# Patient Record
Sex: MALE | Race: OTHER | NOT HISPANIC OR LATINO | URBAN - METROPOLITAN AREA
[De-identification: names, ages, dates, MRNs, and addresses within clinical notes are randomized per-mention and may not be internally consistent; named-entity substitution may affect disease eponyms.]

---

## 2022-11-25 ENCOUNTER — HOSPITAL ENCOUNTER (INPATIENT)
Facility: HOSPITAL | Age: 42
LOS: 6 days | Discharge: HOME | DRG: 885 | End: 2022-12-01
Attending: PSYCHIATRY & NEUROLOGY | Admitting: PSYCHIATRY & NEUROLOGY

## 2022-11-25 ENCOUNTER — HOSPITAL ENCOUNTER (EMERGENCY)
Facility: HOSPITAL | Age: 42
Discharge: PSYCHIATRIC OR SUBSTANCE USE FACILITY | End: 2022-11-25
Attending: FAMILY MEDICINE
Payer: MEDICAID

## 2022-11-25 VITALS
BODY MASS INDEX: 33.34 KG/M2 | DIASTOLIC BLOOD PRESSURE: 91 MMHG | WEIGHT: 220 LBS | TEMPERATURE: 98 F | RESPIRATION RATE: 20 BRPM | HEIGHT: 68 IN | HEART RATE: 85 BPM | SYSTOLIC BLOOD PRESSURE: 137 MMHG | OXYGEN SATURATION: 94 %

## 2022-11-25 DIAGNOSIS — F32.9 MAJOR DEPRESSION: ICD-10-CM

## 2022-11-25 DIAGNOSIS — R45.851 SUICIDAL THOUGHTS: Primary | ICD-10-CM

## 2022-11-25 LAB
ALBUMIN SERPL-MCNC: 4.7 GM/DL (ref 3.5–5)
ALBUMIN/GLOB SERPL: 1.2 RATIO (ref 1.1–2)
ALP SERPL-CCNC: 103 UNIT/L (ref 40–150)
ALT SERPL-CCNC: 40 UNIT/L (ref 0–55)
AMPHET UR QL SCN: NEGATIVE
APAP SERPL-MCNC: <17.4 UG/ML (ref 17.4–30)
APPEARANCE UR: CLEAR
AST SERPL-CCNC: 23 UNIT/L (ref 5–34)
BACTERIA #/AREA URNS AUTO: ABNORMAL /HPF
BARBITURATE SCN PRESENT UR: NEGATIVE
BASOPHILS # BLD AUTO: 0.03 X10(3)/MCL (ref 0–0.2)
BASOPHILS NFR BLD AUTO: 0.4 %
BENZODIAZ UR QL SCN: NEGATIVE
BILIRUB UR QL STRIP.AUTO: NEGATIVE MG/DL
BILIRUBIN DIRECT+TOT PNL SERPL-MCNC: 0.5 MG/DL
BUN SERPL-MCNC: 15.4 MG/DL (ref 8.9–20.6)
CALCIUM SERPL-MCNC: 9.7 MG/DL (ref 8.4–10.2)
CANNABINOIDS UR QL SCN: NEGATIVE
CHLORIDE SERPL-SCNC: 106 MMOL/L (ref 98–107)
CO2 SERPL-SCNC: 20 MMOL/L (ref 22–29)
COCAINE UR QL SCN: NEGATIVE
COLOR UR AUTO: YELLOW
CREAT SERPL-MCNC: 0.84 MG/DL (ref 0.73–1.18)
EOSINOPHIL # BLD AUTO: 0.03 X10(3)/MCL (ref 0–0.9)
EOSINOPHIL NFR BLD AUTO: 0.4 %
ERYTHROCYTE [DISTWIDTH] IN BLOOD BY AUTOMATED COUNT: 12.8 % (ref 11.5–17)
ETHANOL SERPL-MCNC: <10 MG/DL
FLUAV AG UPPER RESP QL IA.RAPID: NOT DETECTED
FLUBV AG UPPER RESP QL IA.RAPID: NOT DETECTED
GFR SERPLBLD CREATININE-BSD FMLA CKD-EPI: >60 MLS/MIN/1.73/M2
GLOBULIN SER-MCNC: 3.8 GM/DL (ref 2.4–3.5)
GLUCOSE SERPL-MCNC: 119 MG/DL (ref 74–100)
GLUCOSE UR QL STRIP.AUTO: NEGATIVE MG/DL
HCT VFR BLD AUTO: 46.1 % (ref 42–52)
HGB BLD-MCNC: 15.7 GM/DL (ref 14–18)
HYALINE CASTS URNS QL MICRO: ABNORMAL /LPF
IMM GRANULOCYTES # BLD AUTO: 0.02 X10(3)/MCL (ref 0–0.04)
IMM GRANULOCYTES NFR BLD AUTO: 0.3 %
KETONES UR QL STRIP.AUTO: NEGATIVE MG/DL
LEUKOCYTE ESTERASE UR QL STRIP.AUTO: NEGATIVE UNIT/L
LYMPHOCYTES # BLD AUTO: 1.02 X10(3)/MCL (ref 0.6–4.6)
LYMPHOCYTES NFR BLD AUTO: 13.1 %
MCH RBC QN AUTO: 28.1 PG (ref 27–31)
MCHC RBC AUTO-ENTMCNC: 34.1 MG/DL (ref 33–36)
MCV RBC AUTO: 82.5 FL (ref 80–94)
MONOCYTES # BLD AUTO: 0.5 X10(3)/MCL (ref 0.1–1.3)
MONOCYTES NFR BLD AUTO: 6.4 %
MUCOUS THREADS URNS QL MICRO: ABNORMAL /LPF
NEUTROPHILS # BLD AUTO: 6.2 X10(3)/MCL (ref 2.1–9.2)
NEUTROPHILS NFR BLD AUTO: 79.4 %
NITRITE UR QL STRIP.AUTO: NEGATIVE
OPIATES UR QL SCN: NEGATIVE
PCP UR QL: NEGATIVE
PH UR STRIP.AUTO: 7 [PH]
PH UR: 7 [PH] (ref 3–11)
PLATELET # BLD AUTO: 309 X10(3)/MCL (ref 130–400)
PMV BLD AUTO: 9 FL (ref 7.4–10.4)
POTASSIUM SERPL-SCNC: 3.5 MMOL/L (ref 3.5–5.1)
PROT SERPL-MCNC: 8.5 GM/DL (ref 6.4–8.3)
PROT UR QL STRIP.AUTO: >=300 MG/DL
RBC # BLD AUTO: 5.59 X10(6)/MCL (ref 4.7–6.1)
RBC #/AREA URNS AUTO: ABNORMAL /HPF
RBC UR QL AUTO: ABNORMAL UNIT/L
SALICYLATES SERPL-MCNC: <5 MG/DL
SARS-COV-2 RNA RESP QL NAA+PROBE: NOT DETECTED
SODIUM SERPL-SCNC: 140 MMOL/L (ref 136–145)
SP GR UR STRIP.AUTO: >=1.03
SPECIFIC GRAVITY, URINE AUTO (.000) (OHS): >=1.03 (ref 1–1.03)
SQUAMOUS #/AREA URNS AUTO: ABNORMAL /HPF
UROBILINOGEN UR STRIP-ACNC: 0.2 MG/DL
WBC # SPEC AUTO: 7.8 X10(3)/MCL (ref 4.5–11.5)
WBC #/AREA URNS AUTO: ABNORMAL /HPF

## 2022-11-25 PROCEDURE — 25000003 PHARM REV CODE 250: Performed by: FAMILY MEDICINE

## 2022-11-25 PROCEDURE — 81003 URINALYSIS AUTO W/O SCOPE: CPT | Performed by: FAMILY MEDICINE

## 2022-11-25 PROCEDURE — 25000003 PHARM REV CODE 250: Performed by: PSYCHIATRY & NEUROLOGY

## 2022-11-25 PROCEDURE — 80307 DRUG TEST PRSMV CHEM ANLYZR: CPT | Performed by: FAMILY MEDICINE

## 2022-11-25 PROCEDURE — 99285 EMERGENCY DEPT VISIT HI MDM: CPT

## 2022-11-25 PROCEDURE — 80179 DRUG ASSAY SALICYLATE: CPT | Performed by: FAMILY MEDICINE

## 2022-11-25 PROCEDURE — 80053 COMPREHEN METABOLIC PANEL: CPT | Performed by: FAMILY MEDICINE

## 2022-11-25 PROCEDURE — 85025 COMPLETE CBC W/AUTO DIFF WBC: CPT | Performed by: FAMILY MEDICINE

## 2022-11-25 PROCEDURE — 82077 ASSAY SPEC XCP UR&BREATH IA: CPT | Performed by: FAMILY MEDICINE

## 2022-11-25 PROCEDURE — 0240U COVID/FLU A&B PCR: CPT | Performed by: FAMILY MEDICINE

## 2022-11-25 PROCEDURE — 25000003 PHARM REV CODE 250: Performed by: PEDIATRICS

## 2022-11-25 PROCEDURE — 81001 URINALYSIS AUTO W/SCOPE: CPT | Performed by: FAMILY MEDICINE

## 2022-11-25 PROCEDURE — 80143 DRUG ASSAY ACETAMINOPHEN: CPT | Performed by: FAMILY MEDICINE

## 2022-11-25 PROCEDURE — 11400000 HC PSYCH PRIVATE ROOM

## 2022-11-25 RX ORDER — SERTRALINE HYDROCHLORIDE 50 MG/1
50 TABLET, FILM COATED ORAL DAILY
Status: DISCONTINUED | OUTPATIENT
Start: 2022-11-25 | End: 2022-11-28

## 2022-11-25 RX ORDER — CLONIDINE HYDROCHLORIDE 0.1 MG/1
0.2 TABLET ORAL EVERY 8 HOURS PRN
Status: DISCONTINUED | OUTPATIENT
Start: 2022-11-25 | End: 2022-12-01 | Stop reason: HOSPADM

## 2022-11-25 RX ORDER — ADHESIVE BANDAGE
30 BANDAGE TOPICAL DAILY PRN
Status: DISCONTINUED | OUTPATIENT
Start: 2022-11-25 | End: 2022-12-01 | Stop reason: HOSPADM

## 2022-11-25 RX ORDER — CLONIDINE HYDROCHLORIDE 0.2 MG/1
0.2 TABLET ORAL
Status: COMPLETED | OUTPATIENT
Start: 2022-11-25 | End: 2022-11-25

## 2022-11-25 RX ORDER — TRAZODONE HYDROCHLORIDE 100 MG/1
100 TABLET ORAL NIGHTLY PRN
Status: DISCONTINUED | OUTPATIENT
Start: 2022-11-25 | End: 2022-12-01 | Stop reason: HOSPADM

## 2022-11-25 RX ORDER — MAG HYDROX/ALUMINUM HYD/SIMETH 200-200-20
30 SUSPENSION, ORAL (FINAL DOSE FORM) ORAL EVERY 6 HOURS PRN
Status: DISCONTINUED | OUTPATIENT
Start: 2022-11-25 | End: 2022-12-01 | Stop reason: HOSPADM

## 2022-11-25 RX ORDER — ONDANSETRON 4 MG/1
4 TABLET, ORALLY DISINTEGRATING ORAL EVERY 8 HOURS PRN
Status: DISCONTINUED | OUTPATIENT
Start: 2022-11-25 | End: 2022-12-01 | Stop reason: HOSPADM

## 2022-11-25 RX ORDER — IBUPROFEN 200 MG
1 TABLET ORAL DAILY
Status: DISCONTINUED | OUTPATIENT
Start: 2022-11-25 | End: 2022-12-01 | Stop reason: HOSPADM

## 2022-11-25 RX ORDER — PROMETHAZINE HYDROCHLORIDE 25 MG/1
25 TABLET ORAL EVERY 6 HOURS PRN
Status: DISCONTINUED | OUTPATIENT
Start: 2022-11-25 | End: 2022-12-01 | Stop reason: HOSPADM

## 2022-11-25 RX ORDER — HYDROXYZINE HYDROCHLORIDE 50 MG/1
50 TABLET, FILM COATED ORAL EVERY 4 HOURS PRN
Status: DISCONTINUED | OUTPATIENT
Start: 2022-11-25 | End: 2022-12-01 | Stop reason: HOSPADM

## 2022-11-25 RX ORDER — ACETAMINOPHEN 325 MG/1
650 TABLET ORAL EVERY 6 HOURS PRN
Status: DISCONTINUED | OUTPATIENT
Start: 2022-11-25 | End: 2022-12-01 | Stop reason: HOSPADM

## 2022-11-25 RX ADMIN — CLONIDINE HYDROCHLORIDE 0.2 MG: 0.2 TABLET ORAL at 03:11

## 2022-11-25 RX ADMIN — CLONIDINE HYDROCHLORIDE 0.2 MG: 0.1 TABLET ORAL at 05:11

## 2022-11-25 RX ADMIN — SERTRALINE HYDROCHLORIDE 50 MG: 50 TABLET ORAL at 12:11

## 2022-11-25 NOTE — ED PROVIDER NOTES
Encounter Date: 11/25/2022       History     Chief Complaint   Patient presents with    Suicidal     Patient arguing with spouse a lot. States he lives in a toxic relationship. Feels in his heart he wants to die. On scene asks  to kill him.      42-year-old male patient who apparently called a telephone number that his spouse has been texting and a male answered the number and verified that the patient knew that his spouse was cheating on him patient then when the police arrived to his trailer tried to get the police to shoot him trying to perform suicide by cup patient otherwise states that he does not want to live because he is in love with his spouse.  Otherwise patient no known suicidal history or ideation history no homicidal ideations no psychiatric illness diagnosed in the past      Review of patient's allergies indicates:  No Known Allergies  No past medical history on file.  No past surgical history on file.  No family history on file.     Review of Systems   Constitutional:  Negative for fever.   HENT:  Negative for sore throat.    Respiratory:  Negative for shortness of breath.    Cardiovascular:  Negative for chest pain.   Gastrointestinal:  Negative for nausea.   Genitourinary:  Negative for dysuria.   Musculoskeletal:  Negative for back pain.   Skin:  Negative for rash.   Neurological:  Negative for weakness.   Hematological:  Does not bruise/bleed easily.   Psychiatric/Behavioral:  Positive for suicidal ideas.    All other systems reviewed and are negative.    Physical Exam     Initial Vitals [11/25/22 0256]   BP Pulse Resp Temp SpO2   (!) 205/152 (!) 111 20 98.4 °F (36.9 °C) 98 %      MAP       --         Physical Exam    Nursing note and vitals reviewed.  Constitutional: He appears well-developed and well-nourished. He is not diaphoretic. No distress.   HENT:   Head: Normocephalic and atraumatic.   Right Ear: External ear normal.   Left Ear: External ear normal.   Nose: Nose normal.    Mouth/Throat: Oropharynx is clear and moist. No oropharyngeal exudate.   Eyes: Conjunctivae and EOM are normal. Pupils are equal, round, and reactive to light. Right eye exhibits no discharge. Left eye exhibits no discharge.   Neck: Neck supple. No thyromegaly present. No tracheal deviation present. No JVD present.   Normal range of motion.  Cardiovascular:  Normal rate, regular rhythm, normal heart sounds and intact distal pulses.     Exam reveals no gallop and no friction rub.       No murmur heard.  Pulmonary/Chest: Breath sounds normal. No stridor. No respiratory distress. He has no wheezes. He has no rhonchi. He has no rales. He exhibits no tenderness.   Abdominal: Abdomen is soft. Bowel sounds are normal. He exhibits no distension. There is no abdominal tenderness. There is no rebound and no guarding.   Musculoskeletal:         General: No tenderness or edema. Normal range of motion.      Cervical back: Normal range of motion and neck supple.     Lymphadenopathy:     He has no cervical adenopathy.   Neurological: He is alert and oriented to person, place, and time. He has normal strength and normal reflexes. No cranial nerve deficit or sensory deficit. GCS score is 15. GCS eye subscore is 4. GCS verbal subscore is 5. GCS motor subscore is 6.   Skin: Skin is warm and dry. No rash and no abscess noted. No erythema.   Psychiatric:   Patient is tearful and upset about his spouse and her actions       ED Course   Procedures  Labs Reviewed   CBC W/ AUTO DIFFERENTIAL    Narrative:     The following orders were created for panel order CBC auto differential.  Procedure                               Abnormality         Status                     ---------                               -----------         ------                     CBC with Differential[[id number]]                                                         Please view results for these tests on the individual orders.   COMPREHENSIVE METABOLIC PANEL    URINALYSIS, REFLEX TO URINE CULTURE   DRUG SCREEN, URINE (BEAKER)   ALCOHOL,MEDICAL (ETHANOL)   ACETAMINOPHEN LEVEL   COVID/FLU A&B PCR   SALICYLATE LEVEL   CBC WITH DIFFERENTIAL          Imaging Results    None          Medications - No data to display                           Clinical Impression:   Final diagnoses:  [R48.767] Suicidal thoughts (Primary)      ED Disposition Condition    Transfer to Psych Facility Stable          ED Prescriptions    None       Follow-up Information    None          Jesse Deutsch MD  11/25/22 0303

## 2022-11-25 NOTE — NURSING
/106.  Rechecked /110.  Dr. Jean notified.  New orders for clonidine 0.2 mg.  Q 8 hours PRN BP greater than 155/90.

## 2022-11-25 NOTE — GROUP NOTE
Group Psychotherapy       Group Focus: Communication Skills      Number of patients in attendance: 8    Group Start Time: 1600  Group End Time:  1630  Groups Date: 11/25/2022  Group Topic:  Behavioral Health  Group Department: [facility]  Group Facilitators:  Chacha Lopez RN  _____________________________________________________________________    Patient Name: Familia Gamboa  MRN: [id number]  Patient Class: IP- Psych   Admission Date\Time: 11/25/2022  7:57 AM  Hospital Length of Stay: 0  Primary Care Provider: Harjit Jean MD     Referred by: {PSY IP REFERRED BY:95825}     Target symptoms: {Target Symptoms:15888}     Patient's response to treatment: {PSY IP PATIENT'S RESPONSE:58612}     Progress toward goals: {PSY IP PROGRESS TOWARD GOALS:50659}     Interval History: ***     Diagnosis: ***     Plan: {PSY IP PLAN:24510}

## 2022-11-25 NOTE — H&P
11/25/2022 11:50 AM   Familia Gamboa   1980   [id number]            Psychiatry Inpatient Admission Note    Date of Admission: 11/25/2022  7:57 AM    Current Legal Status:  PEC    Chief Complaint: Suicidal ideation    SUBJECTIVE:   History of Present Illness:   Familia Gamboa is a 42 y.o. male placed under a PEC at [facility] after calling a phone number that his girlfriend had been texting and a male answered, then attempting to get police to shoot him.  He states that he has a child with this individual.  They were on the way back from [place] to [place] and stopped in [place].  He told his girlfriend to continue home.  When police arrived, he told them that he did not want to live.  He is tearful at times discussing the circumstance with his girlfriend.  Denies prior suicide attempts. Hypertensive.  Will have medical address.  Admitted for medication management and safety monitoring.    UDS: (-)  Blood alcohol: <10      Past Psychiatric History:   Previous Psychiatric Hospitalizations: Denies   Previous Suicide Attempts: Denies   Outpatient psychiatrist: Denies    Past Medical/Surgical History:   History reviewed. No pertinent past medical history.  No past surgical history on file.  Hypertension    Family Psychiatric History:   Denies     Allergies:   Review of patient's allergies indicates:  No Known Allergies    Substance Abuse History:   Tobacco: Denies  Alcohol: Denies  Illicit Substances: Denies  Treatment: N/a      Current Medications:   Home Psychiatric Meds: Denies    Scheduled Meds:    nicotine  1 patch Transdermal Daily      PRN Meds: acetaminophen, aluminum-magnesium hydroxide-simethicone, hydrOXYzine HCL, magnesium hydroxide 400 mg/5 ml, ondansetron, promethazine, trazodone   Psychotherapeutics (From admission, onward)      Start     Stop Route Frequency Ordered    11/25/22 0839  traZODone tablet 100 mg         -- Oral Nightly PRN 11/25/22 0839              Social  History:  Housing Status: Lives in [place] with his girlfriend and children  Relationship Status/Sexual Orientation: In a relationship   Children: 1 biological child here.  1 biological child in [place].  Education: 6th grad education   Employment Status/Info: Works construction    history: Denies  History of physical/sexual abuse: Denies   Access to gun: Denies       Legal History:   Past Charges/Incarcerations: Denies   Pending charges: Denies      OBJECTIVE:   Medical Review Of Systems:  Constitutional: negative  Respiratory: negative  Cardiovascular: negative  Gastrointestinal: negative  Genitourinary:negative  Musculoskeletal:negative  Neurological: negative    Vitals   Vitals:    11/25/22 0730   BP: (!) 171/109   Pulse: 98   Resp: 20   Temp: 96.2 °F (35.7 °C)        Labs/Imaging/Studies:   Recent Results (from the past 48 hour(s))   Urinalysis, Reflex to Urine Culture Urine, Clean Catch    Collection Time: 11/25/22  3:26 AM    Specimen: Urine   Result Value Ref Range    Color, UA Yellow Yellow, Light-Yellow, Dark Yellow, Agnes, Straw    Appearance, UA Clear Clear    Specific Gravity, UA >=1.030     pH, UA 7.0 5.0 - 8.5    Protein, UA >=300 (A) Negative mg/dL    Glucose, UA Negative Negative, Normal mg/dL    Ketones, UA Negative Negative mg/dL    Blood, UA Trace-Intact (A) Negative unit/L    Bilirubin, UA Negative Negative mg/dL    Urobilinogen, UA 0.2 0.2, 1.0, Normal mg/dL    Nitrites, UA Negative Negative    Leukocyte Esterase, UA Negative Negative unit/L   Drug Screen, Urine    Collection Time: 11/25/22  3:26 AM   Result Value Ref Range    Amphetamines, Urine Negative Negative    Barbituates, Urine Negative Negative    Benzodiazepine, Urine Negative Negative    Cannabinoids, Urine Negative Negative    Cocaine, Urine Negative Negative    Opiates, Urine Negative Negative    Phencyclidine, Urine Negative Negative    pH, Urine 7.0 3.0 - 11.0    Specific Gravity, Urine Auto >=1.030 1.001 - 1.035    COVID/FLU A&B PCR    Collection Time: 11/25/22  3:26 AM   Result Value Ref Range    Influenza A PCR Not Detected Not Detected    Influenza B PCR Not Detected Not Detected    SARS-CoV-2 PCR Not Detected Not Detected   Urinalysis, Microscopic    Collection Time: 11/25/22  3:26 AM   Result Value Ref Range    Bacteria, UA None Seen None Seen, Rare, Occasional /HPF    Hyaline Casts, UA Few (A) None Seen /LPF    Mucous, UA Small (A) None Seen /LPF    RBC, UA 3-5 None Seen, 0-2, 3-5, 0-5 /HPF    WBC, UA 0-2 None Seen, 0-2, 3-5, 0-5 /HPF    Squamous Epithelial Cells, UA Few (A) None Seen, Rare, Occasional, Occ /HPF   Comprehensive metabolic panel    Collection Time: 11/25/22  3:28 AM   Result Value Ref Range    Sodium Level 140 136 - 145 mmol/L    Potassium Level 3.5 3.5 - 5.1 mmol/L    Chloride 106 98 - 107 mmol/L    Carbon Dioxide 20 (L) 22 - 29 mmol/L    Glucose Level 119 (H) 74 - 100 mg/dL    Blood Urea Nitrogen 15.4 8.9 - 20.6 mg/dL    Creatinine 0.84 0.73 - 1.18 mg/dL    Calcium Level Total 9.7 8.4 - 10.2 mg/dL    Protein Total 8.5 (H) 6.4 - 8.3 gm/dL    Albumin Level 4.7 3.5 - 5.0 gm/dL    Globulin 3.8 (H) 2.4 - 3.5 gm/dL    Albumin/Globulin Ratio 1.2 1.1 - 2.0 ratio    Bilirubin Total 0.5 <=1.5 mg/dL    Alkaline Phosphatase 103 40 - 150 unit/L    Alanine Aminotransferase 40 0 - 55 unit/L    Aspartate Aminotransferase 23 5 - 34 unit/L    eGFR >60 mls/min/1.73/m2   Ethanol    Collection Time: 11/25/22  3:28 AM   Result Value Ref Range    Ethanol Level <10.0 <=10.0 mg/dL   Acetaminophen level    Collection Time: 11/25/22  3:28 AM   Result Value Ref Range    Acetaminophen Level <17.4 (L) 17.4 - 30.0 ug/ml   Salicylate level    Collection Time: 11/25/22  3:28 AM   Result Value Ref Range    Salicylate Level <5.0 mg/dL   CBC with Differential    Collection Time: 11/25/22  3:28 AM   Result Value Ref Range    WBC 7.8 4.5 - 11.5 x10(3)/mcL    RBC 5.59 4.70 - 6.10 x10(6)/mcL    Hgb 15.7 14.0 - 18.0 gm/dL    Hct 46.1 42.0 -  52.0 %    MCV 82.5 80.0 - 94.0 fL    MCH 28.1 27.0 - 31.0 pg    MCHC 34.1 33.0 - 36.0 mg/dL    RDW 12.8 11.5 - 17.0 %    Platelet 309 130 - 400 x10(3)/mcL    MPV 9.0 7.4 - 10.4 fL    Neut % 79.4 %    Lymph % 13.1 %    Mono % 6.4 %    Eos % 0.4 %    Basophil % 0.4 %    Lymph # 1.02 0.6 - 4.6 x10(3)/mcL    Neut # 6.2 2.1 - 9.2 x10(3)/mcL    Mono # 0.50 0.1 - 1.3 x10(3)/mcL    Eos # 0.03 0 - 0.9 x10(3)/mcL    Baso # 0.03 0 - 0.2 x10(3)/mcL    IG# 0.02 0 - 0.04 x10(3)/mcL    IG% 0.3 %      No results found for: PHENYTOIN, PHENOBARB, VALPROATE, CBMZ        Psychiatric Mental Status Exam:  General Appearance: appears stated age, well-developed, well-nourished  Arousal: alert  Behavior: cooperative  Movements and Motor Activity: no abnormal involuntary movements noted  Orientation: oriented to person, place, time, and situation  Speech: normal rate, normal rhythm, normal volume, normal tone  Mood: Depressed  Affect: tearful  Thought Process: linear  Associations: intact  Thought Content and Perceptions: (+)suicidal ideation, no homicidal ideation, no auditory hallucinations, no visual hallucinations, no paranoid ideation, no ideas of reference, no evidence of delusions or psychosis  Recent and Remote Memory: recent memory intact, remote memory intact  Attention and Concentration: intact, attentive to conversation  Fund of Knowledge: intact, aware of current events, vocabulary appropriate  Insight: intact  Judgment: questionable      Patient Strengths:  Access to care and Able to verbalize needs      Patient Liabilities:  Depression      Discharge Criteria:  Improved mood, Overall functional improvement, and Improved coping skills    ASSESSMENT/PLAN:   Diagnosis:  Unspecified depression (F32.9)    History reviewed. No pertinent past medical history.       Plan:  -Admit to [facility]  -Sertraline 50mg daily  -Will attempt to obtain outside psychiatric records if available  - to assist with aftercare planning and  collateral  -Once stable discharge home with outpatient follow up care and/or rehab  -Continue inpatient treatment under a PEC and/or CEC for danger to self/ danger to others/grave disability as evidenced by danger to self and suicidal ideation      Estimated length of stay: 5-7 days    Estimated Disposition: Home    Estimated Follow-up: Outpatient medication management      On this date, I have reviewed the medical history and Nursing Assessment, as well as records from referral source.  I have evaluated the mental status of the above named person and concur with the findings of all assessments.  I have provided medical direction for the development of the Treatment Plan.    I conclude that this patient meets admission criteria for inpatient treatment.  I certify that this patient poses a danger to self or others, or would otherwise be considered gravely disabled based on this assessment and/or provided collateral information.     I have provided medical direction for the development of the Treatment plan.  These services will be provided while this patient is under my care and will be based on an individualized plan of care.  The patient can demonstrate a reasonable expectation of improvement in his/her disorder as a result of the active treatment being provided.      Christiano Fry M.D.

## 2022-11-25 NOTE — PLAN OF CARE
Problem: Adult Inpatient Plan of Care  Goal: Plan of Care Review  Outcome: Ongoing, Progressing  Goal: Patient-Specific Goal (Individualized)  Outcome: Ongoing, Progressing  Goal: Absence of Hospital-Acquired Illness or Injury  Outcome: Ongoing, Progressing  Goal: Optimal Comfort and Wellbeing  Outcome: Ongoing, Progressing  Goal: Readiness for Transition of Care  Outcome: Ongoing, Progressing     Problem: Violence Risk or Actual  Goal: Anger and Impulse Control  Outcome: Ongoing, Progressing     Problem: Activity and Energy Impairment (Depressive Signs/Symptoms)  Goal: Optimized Energy Level (Depressive Signs/Symptoms)  Outcome: Ongoing, Progressing     Problem: Decreased Participation/Engagement (Depressive Signs/Symptoms)  Goal: Increased Participation and Engagement (Depressive Signs/Symptoms)  Outcome: Ongoing, Progressing     Problem: Feelings of Worthlessness, Hopelessness or Excessive Guilt (Depressive Signs/Symptoms)  Goal: Enhanced Self-Esteem and Confidence (Depressive Signs/Symptoms)  Outcome: Ongoing, Progressing     Problem: Mood Impairment (Depressive Signs/Symptoms)  Goal: Improved Mood Symptoms (Depressive Signs/Symptoms)  Outcome: Ongoing, Progressing

## 2022-11-26 PROBLEM — I10 ESSENTIAL HYPERTENSION: Chronic | Status: ACTIVE | Noted: 2022-11-26

## 2022-11-26 PROCEDURE — 11400000 HC PSYCH PRIVATE ROOM

## 2022-11-26 PROCEDURE — 25000003 PHARM REV CODE 250: Performed by: PEDIATRICS

## 2022-11-26 PROCEDURE — 25000003 PHARM REV CODE 250: Performed by: PSYCHIATRY & NEUROLOGY

## 2022-11-26 RX ORDER — LISINOPRIL 10 MG/1
10 TABLET ORAL DAILY
Status: DISCONTINUED | OUTPATIENT
Start: 2022-11-26 | End: 2022-12-01 | Stop reason: HOSPADM

## 2022-11-26 RX ADMIN — LISINOPRIL 10 MG: 10 TABLET ORAL at 02:11

## 2022-11-26 RX ADMIN — CLONIDINE HYDROCHLORIDE 0.2 MG: 0.1 TABLET ORAL at 08:11

## 2022-11-26 RX ADMIN — SERTRALINE HYDROCHLORIDE 50 MG: 50 TABLET ORAL at 08:11

## 2022-11-26 RX ADMIN — CLONIDINE HYDROCHLORIDE 0.2 MG: 0.1 TABLET ORAL at 09:11

## 2022-11-26 NOTE — H&P
[facility]  History & Physical    Subjective:      Chief Complaint/Reason for Admission: major depression with SI     Familia Gamboa is a 42 y.o. male. Sent on PEC from [place]     History reviewed. No pertinent past medical history.  No past surgical history on file.  Family History   Family history unknown: Yes     Social History     Tobacco Use    Smoking status: Never    Smokeless tobacco: Never   Substance Use Topics    Alcohol use: Never    Drug use: Never       No medications prior to admission.     Review of patient's allergies indicates:  No Known Allergies     Review of Systems   Constitutional: Negative.    HENT: Negative.     Eyes: Negative.    Respiratory: Negative.     Cardiovascular: Negative.    Gastrointestinal: Negative.    Genitourinary: Negative.    Musculoskeletal: Negative.    Skin: Negative.    Neurological: Negative.    Endo/Heme/Allergies: Negative.    Psychiatric/Behavioral:  Positive for depression and suicidal ideas. Negative for hallucinations and substance abuse.      Objective:      Vital Signs (Most Recent)  Temp: 98.8 °F (37.1 °C) (11/25/22 1700)  Pulse: 65 (11/25/22 1700)  Resp: 18 (11/25/22 1700)  BP: (!) 178/111 (11/25/22 1758)  SpO2: 98 % (11/25/22 1700)    Vital Signs Range (Last 24H):  Temp:  [98.8 °F (37.1 °C)]   Pulse:  [65]   Resp:  [18]   BP: (166-178)/(106-111)   SpO2:  [98 %]     Physical Exam  HENT:      Head: Normocephalic.      Right Ear: Tympanic membrane normal.      Left Ear: Tympanic membrane normal.      Nose: Nose normal.      Mouth/Throat:      Mouth: Mucous membranes are moist.   Eyes:      Extraocular Movements: Extraocular movements intact.      Pupils: Pupils are equal, round, and reactive to light.   Cardiovascular:      Rate and Rhythm: Normal rate and regular rhythm.   Pulmonary:      Effort: Pulmonary effort is normal.   Abdominal:      General: Abdomen is flat.   Musculoskeletal:         General: Normal range  of motion.   Skin:     General: Skin is warm.   Neurological:      General: No focal deficit present.      Mental Status: He is alert and oriented to person, place, and time.      Comments: Vision normal   Hearing normal   EOM intact   Face muscles normal  Facial sensation normal   Shrugs shoulders  Tongue midline          Data Review:    Recent Results (from the past 48 hour(s))   Urinalysis, Reflex to Urine Culture Urine, Clean Catch    Collection Time: 11/25/22  3:26 AM    Specimen: Urine   Result Value Ref Range    Color, UA Yellow Yellow, Light-Yellow, Dark Yellow, Agnes, Straw    Appearance, UA Clear Clear    Specific Gravity, UA >=1.030     pH, UA 7.0 5.0 - 8.5    Protein, UA >=300 (A) Negative mg/dL    Glucose, UA Negative Negative, Normal mg/dL    Ketones, UA Negative Negative mg/dL    Blood, UA Trace-Intact (A) Negative unit/L    Bilirubin, UA Negative Negative mg/dL    Urobilinogen, UA 0.2 0.2, 1.0, Normal mg/dL    Nitrites, UA Negative Negative    Leukocyte Esterase, UA Negative Negative unit/L   Drug Screen, Urine    Collection Time: 11/25/22  3:26 AM   Result Value Ref Range    Amphetamines, Urine Negative Negative    Barbituates, Urine Negative Negative    Benzodiazepine, Urine Negative Negative    Cannabinoids, Urine Negative Negative    Cocaine, Urine Negative Negative    Opiates, Urine Negative Negative    Phencyclidine, Urine Negative Negative    pH, Urine 7.0 3.0 - 11.0    Specific Gravity, Urine Auto >=1.030 1.001 - 1.035   COVID/FLU A&B PCR    Collection Time: 11/25/22  3:26 AM   Result Value Ref Range    Influenza A PCR Not Detected Not Detected    Influenza B PCR Not Detected Not Detected    SARS-CoV-2 PCR Not Detected Not Detected   Urinalysis, Microscopic    Collection Time: 11/25/22  3:26 AM   Result Value Ref Range    Bacteria, UA None Seen None Seen, Rare, Occasional /HPF    Hyaline Casts, UA Few (A) None Seen /LPF    Mucous, UA Small (A) None Seen /LPF    RBC, UA 3-5 None Seen, 0-2,  3-5, 0-5 /HPF    WBC, UA 0-2 None Seen, 0-2, 3-5, 0-5 /HPF    Squamous Epithelial Cells, UA Few (A) None Seen, Rare, Occasional, Occ /HPF   Comprehensive metabolic panel    Collection Time: 11/25/22  3:28 AM   Result Value Ref Range    Sodium Level 140 136 - 145 mmol/L    Potassium Level 3.5 3.5 - 5.1 mmol/L    Chloride 106 98 - 107 mmol/L    Carbon Dioxide 20 (L) 22 - 29 mmol/L    Glucose Level 119 (H) 74 - 100 mg/dL    Blood Urea Nitrogen 15.4 8.9 - 20.6 mg/dL    Creatinine 0.84 0.73 - 1.18 mg/dL    Calcium Level Total 9.7 8.4 - 10.2 mg/dL    Protein Total 8.5 (H) 6.4 - 8.3 gm/dL    Albumin Level 4.7 3.5 - 5.0 gm/dL    Globulin 3.8 (H) 2.4 - 3.5 gm/dL    Albumin/Globulin Ratio 1.2 1.1 - 2.0 ratio    Bilirubin Total 0.5 <=1.5 mg/dL    Alkaline Phosphatase 103 40 - 150 unit/L    Alanine Aminotransferase 40 0 - 55 unit/L    Aspartate Aminotransferase 23 5 - 34 unit/L    eGFR >60 mls/min/1.73/m2   Ethanol    Collection Time: 11/25/22  3:28 AM   Result Value Ref Range    Ethanol Level <10.0 <=10.0 mg/dL   Acetaminophen level    Collection Time: 11/25/22  3:28 AM   Result Value Ref Range    Acetaminophen Level <17.4 (L) 17.4 - 30.0 ug/ml   Salicylate level    Collection Time: 11/25/22  3:28 AM   Result Value Ref Range    Salicylate Level <5.0 mg/dL   CBC with Differential    Collection Time: 11/25/22  3:28 AM   Result Value Ref Range    WBC 7.8 4.5 - 11.5 x10(3)/mcL    RBC 5.59 4.70 - 6.10 x10(6)/mcL    Hgb 15.7 14.0 - 18.0 gm/dL    Hct 46.1 42.0 - 52.0 %    MCV 82.5 80.0 - 94.0 fL    MCH 28.1 27.0 - 31.0 pg    MCHC 34.1 33.0 - 36.0 mg/dL    RDW 12.8 11.5 - 17.0 %    Platelet 309 130 - 400 x10(3)/mcL    MPV 9.0 7.4 - 10.4 fL    Neut % 79.4 %    Lymph % 13.1 %    Mono % 6.4 %    Eos % 0.4 %    Basophil % 0.4 %    Lymph # 1.02 0.6 - 4.6 x10(3)/mcL    Neut # 6.2 2.1 - 9.2 x10(3)/mcL    Mono # 0.50 0.1 - 1.3 x10(3)/mcL    Eos # 0.03 0 - 0.9 x10(3)/mcL    Baso # 0.03 0 - 0.2 x10(3)/mcL    IG# 0.02 0 - 0.04 x10(3)/mcL    IG%  0.3 %        No results found.       Assessment and Plan       Major depression  Essential hypertension

## 2022-11-27 PROCEDURE — 25000003 PHARM REV CODE 250: Performed by: PEDIATRICS

## 2022-11-27 PROCEDURE — 25000003 PHARM REV CODE 250: Performed by: PSYCHIATRY & NEUROLOGY

## 2022-11-27 PROCEDURE — 11400000 HC PSYCH PRIVATE ROOM

## 2022-11-27 RX ADMIN — SERTRALINE HYDROCHLORIDE 50 MG: 50 TABLET ORAL at 08:11

## 2022-11-27 RX ADMIN — LISINOPRIL 10 MG: 10 TABLET ORAL at 08:11

## 2022-11-27 NOTE — PLAN OF CARE
Problem: Adult Inpatient Plan of Care  Goal: Plan of Care Review  11/26/2022 2005 by Trini Riggs LPN  Outcome: Ongoing, Progressing  11/26/2022 1956 by Trini Riggs LPN  Outcome: Ongoing, Progressing  Goal: Patient-Specific Goal (Individualized)  11/26/2022 2005 by Trini Riggs LPN  Outcome: Ongoing, Progressing  11/26/2022 1956 by Trini Riggs LPN  Outcome: Ongoing, Progressing  Goal: Absence of Hospital-Acquired Illness or Injury  11/26/2022 2005 by Trini Riggs LPN  Outcome: Ongoing, Progressing  11/26/2022 1956 by Trini Riggs LPN  Outcome: Ongoing, Progressing  Goal: Optimal Comfort and Wellbeing  11/26/2022 2005 by Trini Riggs LPN  Outcome: Ongoing, Progressing  11/26/2022 1956 by Trini Riggs LPN  Outcome: Ongoing, Progressing  Goal: Readiness for Transition of Care  11/26/2022 2005 by Trini Riggs LPN  Outcome: Ongoing, Progressing  11/26/2022 1956 by Trini Riggs LPN  Outcome: Ongoing, Progressing     Problem: Violence Risk or Actual  Goal: Anger and Impulse Control  11/26/2022 2005 by Trini Riggs LPN  Outcome: Ongoing, Progressing  11/26/2022 1956 by Trini Riggs LPN  Outcome: Ongoing, Progressing     Problem: Activity and Energy Impairment (Depressive Signs/Symptoms)  Goal: Optimized Energy Level (Depressive Signs/Symptoms)  11/26/2022 2005 by Trini Riggs LPN  Outcome: Ongoing, Progressing  11/26/2022 1956 by Trini Riggs LPN  Outcome: Ongoing, Progressing     Problem: Decreased Participation/Engagement (Depressive Signs/Symptoms)  Goal: Increased Participation and Engagement (Depressive Signs/Symptoms)  11/26/2022 2005 by Trini Riggs LPN  Outcome: Ongoing, Progressing  11/26/2022 1956 by Trini Riggs LPN  Outcome: Ongoing, Progressing     Problem: Feelings of Worthlessness, Hopelessness or Excessive Guilt (Depressive Signs/Symptoms)  Goal: Enhanced Self-Esteem and Confidence (Depressive  Signs/Symptoms)  11/26/2022 2005 by Trini Riggs LPN  Outcome: Ongoing, Progressing  11/26/2022 1956 by Trini Riggs LPN  Outcome: Ongoing, Progressing     Problem: Mood Impairment (Depressive Signs/Symptoms)  Goal: Improved Mood Symptoms (Depressive Signs/Symptoms)  11/26/2022 2005 by Trini Riggs LPN  Outcome: Ongoing, Progressing  11/26/2022 1956 by Trini Riggs LPN  Outcome: Ongoing, Progressing

## 2022-11-27 NOTE — PLAN OF CARE
"Psychosocial Assessment     Pt is a 42 y.o. YO  male admitted due to Depression. Pt UDS was Negative.  Pt ETOH < 10. Pt denies  SI, HI, and AVH at this time. Pt's first inpatient stay. Pt presents Cooperative and Anxious with CM staff 1:1. Pt states that he found out his girlfriend was cheating on him and became very depressed. Pt states that he is no longer depressed because he knows what he has to do and he has support from his family.   Pt originally from [place]  . Pt has  2 dependents. Pt  is Single .  Pt completed Middle School. Pt 0  service.  Pt reports financial issues. Pt employed.  Pt works as a . Pt denies legal issues. Pt states they do  receive comfort from spiritual practices. Pt emergency contact is Sister. Their phone number is Pt discharge plan at this time is home with his sister. Sister does not speak English.     11/27/22 1052   Initial Information   Source of Information patient   Reason for Admission Major Depression   Patient Aware of Diagnosis yes   Limitations on Visitors/Phone Calls none   Temporary Family Living Arrangements (While Hospitalized) none needed   Arrived From emergency department   Current or Previous  Service none   Mutuality/Individual Preferences   Anxieties, Fears or Concerns "Just trying to make money for my kids."   Spiritual Beliefs   Spiritual, Cultural Beliefs, Shinto Practices, Values that Affect Care yes   Description of Beliefs that Will Affect Care Pent[employer]alism    Contact Status none needed   Hospital  Requested no   Coping/Stress/Tolerance   Major Change/Loss/Stressor/Fears relationship concerns   Techniques to [place] with Loss/Stress/Change none   Relationship/Environment   Primary Source of Support/Comfort sibling(s)   Name of Support/Comfort Primary Source Sunita Cordero  ([phone])   Resource/Environmental Concerns   Current Living Arrangements home/apartment/condo   Resource/Environmental Concerns " none   Substance Use/Withdrawal   Substance Use Denies use   Additional Tobacco Use   How many cigarettes do you typically have per day? 0   Abuse Screen (yes response referral indicated)   Feels Unsafe at Home or Work/School no   Feels Threatened by Someone no   Does Anyone Try to Keep You From Having Contact with Others or Doing Things Outside Your Home? no   Physical Signs of Abuse Present no   Violence Risk   Feels Like Hurting Others no   Previous Attempt to Harm Others no   AUDIT-C (Alcohol Use Disorders ID Test)   Alcohol Use In Past Year 0-->never   Alcohol Amount Per Day In Past Year 0-->none   More Than 6 Drinks On One Occasion 0-->never   Total AUDIT-C Score 0   Depression Screen (Over the Past Two Weeks)   Have You Felt Down, Depressed or Hopeless? no   Have You Felt Little Interest or Pleasure in Doing Things? no   Transition Planning   Patient/Family Anticipates Transition to home   Patient/Family Anticipated Services at Transition outpatient care   Transportation Anticipated health plan transportation

## 2022-11-28 PROCEDURE — 25000003 PHARM REV CODE 250: Performed by: PEDIATRICS

## 2022-11-28 PROCEDURE — 11400000 HC PSYCH PRIVATE ROOM

## 2022-11-28 PROCEDURE — 25000003 PHARM REV CODE 250: Performed by: PSYCHIATRY & NEUROLOGY

## 2022-11-28 RX ORDER — SERTRALINE HYDROCHLORIDE 50 MG/1
100 TABLET, FILM COATED ORAL DAILY
Status: DISCONTINUED | OUTPATIENT
Start: 2022-11-29 | End: 2022-12-01 | Stop reason: HOSPADM

## 2022-11-28 RX ADMIN — SERTRALINE HYDROCHLORIDE 50 MG: 50 TABLET ORAL at 08:11

## 2022-11-28 RX ADMIN — LISINOPRIL 10 MG: 10 TABLET ORAL at 08:11

## 2022-11-28 NOTE — PROGRESS NOTES
11/28/22 1000   [facility] Group Therapy   Group Name Therapeutic Recreation   Specific Interventions Skilled Activity Mild Exercises   Participation Level Active;Sharing   Participation Quality Cooperative   Insight/Motivation Limited   Affect/Mood Display Appropriate   Cognition Oriented   Psychomotor WNL

## 2022-11-28 NOTE — PROGRESS NOTES
11/28/2022 11:39 AM   Familia Gamboa   1980   [id number]        Psychiatry Progress Note     SUBJECTIVE:   Familia Gamboa is a 42 y.o. male placed under a PEC at [facility] after calling a phone number that his girlfriend had been texting and a male answered, then attempting to get police to shoot him.  Patient states that he was able to speak with his sister and his current plan is to stay with her in [place] at discharge.  Hopelessness improving.  Tolerating recent addition of sertraline.  Will plan to titrate sertraline today.    UDS: (-)  Blood alcohol: <10       Current Medications:   Scheduled Meds:    lisinopriL  10 mg Oral Daily    nicotine  1 patch Transdermal Daily    sertraline  50 mg Oral Daily      PRN Meds: acetaminophen, aluminum-magnesium hydroxide-simethicone, cloNIDine, hydrOXYzine HCL, magnesium hydroxide 400 mg/5 ml, ondansetron, promethazine, trazodone   Psychotherapeutics (From admission, onward)      Start     Stop Route Frequency Ordered    11/25/22 1215  sertraline tablet 50 mg         -- Oral Daily 11/25/22 1213    11/25/22 0839  traZODone tablet 100 mg         -- Oral Nightly PRN 11/25/22 0839            Allergies:   Review of patient's allergies indicates:  No Known Allergies     OBJECTIVE:   Vitals   Vitals:    11/27/22 1900   BP: (!) 158/83   Pulse: 76   Resp: 19   Temp: 98.8 °F (37.1 °C)        Labs/Imaging/Studies:   No results found for this or any previous visit (from the past 36 hour(s)).       Medical Review Of Systems:  Constitutional: negative  Respiratory: negative  Cardiovascular: negative  Gastrointestinal: negative  Genitourinary:negative  Musculoskeletal:negative  Neurological: negative      Psychiatric Mental Status Exam:  General Appearance: appears stated age, well-developed, well-nourished  Arousal: alert  Behavior: cooperative  Movements and Motor Activity: no abnormal involuntary movements noted  Orientation: oriented to person, place, time, and  situation  Speech: normal rate, normal rhythm, normal volume, normal tone  Mood: Depressed  Affect: improved  Thought Process: linear  Associations: intact  Thought Content and Perceptions: suicidal ideation improving, no homicidal ideation, no auditory hallucinations, no visual hallucinations, no paranoid ideation, no ideas of reference, no evidence of delusions or psychosis  Recent and Remote Memory: recent memory intact, remote memory intact  Attention and Concentration: intact, attentive to conversation  Fund of Knowledge: intact, aware of current events, vocabulary appropriate  Insight: intact  Judgment: questionable    ASSESSMENT/PLAN:   Diagnosis:  Major Depressive Disorder, single episode, severe (F32.2)    History reviewed. No pertinent past medical history.     Plan:  -Increase sertraline to 100mg daily    Expected Disposition Plan:  Sister's house        Christiano Fry M.D.

## 2022-11-28 NOTE — PROGRESS NOTES
11/28/22 1400   [facility] Group Therapy   Group Name Therapeutic Recreation   Specific Interventions Skilled Activity Creative Expression   Participation Level Active;Supportive;Appropriate;Attentive;Sharing   Participation Quality Cooperative   Insight/Motivation Applies New Skills   Affect/Mood Display Appropriate   Cognition Oriented   Psychomotor WNL

## 2022-11-29 PROCEDURE — 11400000 HC PSYCH PRIVATE ROOM

## 2022-11-29 PROCEDURE — 25000003 PHARM REV CODE 250: Performed by: PSYCHIATRY & NEUROLOGY

## 2022-11-29 PROCEDURE — 25000003 PHARM REV CODE 250: Performed by: PEDIATRICS

## 2022-11-29 RX ADMIN — TRAZODONE HYDROCHLORIDE 100 MG: 100 TABLET ORAL at 08:11

## 2022-11-29 RX ADMIN — LISINOPRIL 10 MG: 10 TABLET ORAL at 09:11

## 2022-11-29 RX ADMIN — TRAZODONE HYDROCHLORIDE 100 MG: 100 TABLET ORAL at 12:11

## 2022-11-29 RX ADMIN — SERTRALINE HYDROCHLORIDE 100 MG: 50 TABLET ORAL at 09:11

## 2022-11-29 NOTE — PROGRESS NOTES
11/29/2022 11:39 AM   Familia Gamboa   1980   [id number]        Psychiatry Progress Note     SUBJECTIVE:   Familia Gamboa is a 42 y.o. male placed under a PEC at [facility] after calling a phone number that his girlfriend had been texting and a male answered, then attempting to get police to shoot him.  Patient states that he was able to speak with his sister and his current plan is to stay with her in [place] at discharge.  Hopelessness improving.  Tolerating recent addition of sertraline. Mood is improving. Patient states that he slept well last night after taking the Trazodone.     UDS: (-)  Blood alcohol: <10       Current Medications:   Scheduled Meds:    lisinopriL  10 mg Oral Daily    nicotine  1 patch Transdermal Daily    sertraline  100 mg Oral Daily      PRN Meds: acetaminophen, aluminum-magnesium hydroxide-simethicone, cloNIDine, hydrOXYzine HCL, magnesium hydroxide 400 mg/5 ml, ondansetron, promethazine, trazodone   Psychotherapeutics (From admission, onward)      Start     Stop Route Frequency Ordered    11/29/22 0900  sertraline tablet 100 mg         -- Oral Daily 11/28/22 1144    11/25/22 0839  traZODone tablet 100 mg         -- Oral Nightly PRN 11/25/22 0839            Allergies:   Review of patient's allergies indicates:  No Known Allergies     OBJECTIVE:   Vitals   Vitals:    11/28/22 0700   BP: (!) 145/101   Pulse: 77   Resp: 18   Temp: 98.8 °F (37.1 °C)        Labs/Imaging/Studies:   No results found for this or any previous visit (from the past 36 hour(s)).       Medical Review Of Systems:  Constitutional: negative  Respiratory: negative  Cardiovascular: negative  Gastrointestinal: negative  Genitourinary:negative  Musculoskeletal:negative  Neurological: negative      Psychiatric Mental Status Exam:  General Appearance: appears stated age, well-developed, well-nourished  Arousal: alert  Behavior: cooperative  Movements and Motor Activity: no abnormal involuntary movements  noted  Orientation: oriented to person, place, time, and situation  Speech: normal rate, normal rhythm, normal volume, normal tone  Mood: Depressed  Affect: improved  Thought Process: linear  Associations: intact  Thought Content and Perceptions: suicidal ideation improving, no homicidal ideation, no auditory hallucinations, no visual hallucinations, no paranoid ideation, no ideas of reference, no evidence of delusions or psychosis  Recent and Remote Memory: recent memory intact, remote memory intact  Attention and Concentration: intact, attentive to conversation  Fund of Knowledge: intact, aware of current events, vocabulary appropriate  Insight: intact  Judgment: questionable    ASSESSMENT/PLAN:   Diagnosis:  Major Depressive Disorder, single episode, severe (F32.2)    History reviewed. No pertinent past medical history.     Plan:  -Continue with current POC    Expected Disposition Plan:  Sister's house        SHANON Aldana-BC

## 2022-11-29 NOTE — GROUP NOTE
Group Psychotherapy       Group Focus: Psychodynamic Group Psychotherapy      Number of patients in attendance: 18    Group topic: Anger Management. Therapist explored patients need for increase in effective communication skills, increase in awareness of triggers and understanding anger.??Patient was able to discuss anger triggers, how to have expressive anger in healthy ways. Patient continues to make progress towards treatment goals.??      Group Start Time: 1045  Group End Time:  1115  Groups Date: 11/29/2022  Group Topic:  Behavioral Health  Group Department: [facility] Behavioral Health Unit  Group Facilitators:  Jose Luis Moreira LCSW  _____________________________________________________________________    Patient Name: Familia Gamboa  MRN: [id number]  Patient Class: IP- Psych   Admission Date\Time: 11/25/2022  7:57 AM  Hospital Length of Stay: 4  Primary Care Provider: Harjit Jean MD     Referred by: Acute Psychiatry Unit Treatment Team     Target symptoms: Depression     Patient's response to treatment: Active Listening pt given hand out in Trinidadian      Progress toward goals: Progressing well        Plan: Continue treatment on APU

## 2022-11-29 NOTE — PLAN OF CARE
Problem: Activity and Energy Impairment (Depressive Signs/Symptoms)  Goal: Optimized Energy Level (Depressive Signs/Symptoms)  Outcome: Ongoing, Progressing     Problem: Decreased Participation/Engagement (Depressive Signs/Symptoms)  Goal: Increased Participation and Engagement (Depressive Signs/Symptoms)  Outcome: Ongoing, Progressing     Problem: Feelings of Worthlessness, Hopelessness or Excessive Guilt (Depressive Signs/Symptoms)  Goal: Enhanced Self-Esteem and Confidence (Depressive Signs/Symptoms)  Outcome: Ongoing, Progressing     Problem: Mood Impairment (Depressive Signs/Symptoms)  Goal: Improved Mood Symptoms (Depressive Signs/Symptoms)  Outcome: Ongoing, Progressing

## 2022-11-30 PROBLEM — F32.2 MAJOR DEPRESSIVE DISORDER, SINGLE EPISODE, SEVERE WITHOUT PSYCHOTIC FEATURES: Status: ACTIVE | Noted: 2022-11-30

## 2022-11-30 PROCEDURE — 11400000 HC PSYCH PRIVATE ROOM

## 2022-11-30 PROCEDURE — 25000003 PHARM REV CODE 250: Performed by: PEDIATRICS

## 2022-11-30 PROCEDURE — 25000003 PHARM REV CODE 250: Performed by: PSYCHIATRY & NEUROLOGY

## 2022-11-30 RX ORDER — SERTRALINE HYDROCHLORIDE 100 MG/1
100 TABLET, FILM COATED ORAL DAILY
Qty: 30 TABLET | Refills: 0 | Status: SHIPPED | OUTPATIENT
Start: 2022-12-01 | End: 2023-12-01

## 2022-11-30 RX ORDER — LISINOPRIL 10 MG/1
10 TABLET ORAL DAILY
Qty: 30 TABLET | Refills: 0 | Status: SHIPPED | OUTPATIENT
Start: 2022-12-01 | End: 2023-12-01

## 2022-11-30 RX ADMIN — LISINOPRIL 10 MG: 10 TABLET ORAL at 08:11

## 2022-11-30 RX ADMIN — SERTRALINE HYDROCHLORIDE 100 MG: 50 TABLET ORAL at 08:11

## 2022-11-30 NOTE — CARE UPDATE
Treatment Team    Pt seem for treatment team today with interdisciplinary team.  Pt is Cooperative with Tx team. Pt denies symptoms at this time. MD did not change pt meds at this time. Treatment teams goals Met at this time. Pt DC plan is home. DC date scheduled for tomorrow.

## 2022-11-30 NOTE — PLAN OF CARE
Problem: Activity and Energy Impairment (Depressive Signs/Symptoms)  Goal: Optimized Energy Level (Depressive Signs/Symptoms)  Outcome: Ongoing, Progressing     Problem: Decreased Participation/Engagement (Depressive Signs/Symptoms)  Goal: Increased Participation and Engagement (Depressive Signs/Symptoms)  Outcome: Ongoing, Progressing     Problem: Feelings of Worthlessness, Hopelessness or Excessive Guilt (Depressive Signs/Symptoms)  Goal: Enhanced Self-Esteem and Confidence (Depressive Signs/Symptoms)  Outcome: Ongoing, Progressing     Problem: Mood Impairment (Depressive Signs/Symptoms)  Goal: Improved Mood Symptoms (Depressive Signs/Symptoms)  Outcome: Ongoing, Progressing     Problem: Activity and Energy Impairment (Depressive Signs/Symptoms)  Goal: Optimized Energy Level (Depressive Signs/Symptoms)  Outcome: Ongoing, Progressing     Problem: Decreased Participation/Engagement (Depressive Signs/Symptoms)  Goal: Increased Participation and Engagement (Depressive Signs/Symptoms)  Outcome: Ongoing, Progressing     Problem: Feelings of Worthlessness, Hopelessness or Excessive Guilt (Depressive Signs/Symptoms)  Goal: Enhanced Self-Esteem and Confidence (Depressive Signs/Symptoms)  Outcome: Ongoing, Progressing     Problem: Mood Impairment (Depressive Signs/Symptoms)  Goal: Improved Mood Symptoms (Depressive Signs/Symptoms)  Outcome: Ongoing, Progressing

## 2022-11-30 NOTE — NURSING
Pt is voicing no ADRs or physical complaints at  This time, currently voicing no suicidal or  Homicidal ideations at this time, voicing no  Hallucinations or delusions at this time, pt  Reports his depression and anxiety has   Improved, feels meds are helping him, pt  Is compliant with meds this morning, pt  Reports he is ready to get back to work.

## 2022-11-30 NOTE — PROGRESS NOTES
"11/30/2022 11:39 AM   Familia Gamboa   1980   [id number]        Psychiatry Progress Note     SUBJECTIVE:   Familia Gamboa is a 42 y.o. male placed under a PEC at [facility] after calling a phone number that his girlfriend had been texting and a male answered, then attempting to get police to shoot him.  Staff states that he has been doing well.  No overt behavioral issues noted.  No physical aggression.  States that he is doing "great" and has been sleeping well.  Denies suicidal ideation.  Will plan for discharge to his sister's house tomorrow.    UDS: (-)  Blood alcohol: <10       Current Medications:   Scheduled Meds:    lisinopriL  10 mg Oral Daily    nicotine  1 patch Transdermal Daily    sertraline  100 mg Oral Daily      PRN Meds: acetaminophen, aluminum-magnesium hydroxide-simethicone, cloNIDine, hydrOXYzine HCL, magnesium hydroxide 400 mg/5 ml, ondansetron, promethazine, trazodone   Psychotherapeutics (From admission, onward)      Start     Stop Route Frequency Ordered    11/29/22 0900  sertraline tablet 100 mg         -- Oral Daily 11/28/22 1144    11/25/22 0839  traZODone tablet 100 mg         -- Oral Nightly PRN 11/25/22 0839            Allergies:   Review of patient's allergies indicates:  No Known Allergies     OBJECTIVE:   Vitals   Vitals:    11/30/22 0701   BP: (!) 149/99   Pulse: 74   Resp: 18   Temp: 98.8 °F (37.1 °C)        Labs/Imaging/Studies:   No results found for this or any previous visit (from the past 36 hour(s)).       Medical Review Of Systems:  Constitutional: negative  Respiratory: negative  Cardiovascular: negative  Gastrointestinal: negative  Genitourinary:negative  Musculoskeletal:negative  Neurological: negative      Psychiatric Mental Status Exam:  General Appearance: appears stated age, well-developed, well-nourished  Arousal: alert  Behavior: cooperative  Movements and Motor Activity: no abnormal involuntary movements noted  Orientation: oriented to person, " "place, time, and situation  Speech: normal rate, normal rhythm, normal volume, normal tone  Mood: "Great"  Affect: constricted  Thought Process: linear  Associations: intact  Thought Content and Perceptions: no suicidal ideation, no homicidal ideation, no auditory hallucinations, no visual hallucinations, no paranoid ideation, no ideas of reference, no evidence of delusions or psychosis  Recent and Remote Memory: recent memory intact, remote memory intact  Attention and Concentration: intact, attentive to conversation  Fund of Knowledge: intact, aware of current events, vocabulary appropriate  Insight: intact  Judgment: intact    ASSESSMENT/PLAN:   Diagnosis:  Major Depressive Disorder, single episode, severe (F32.2)    History reviewed. No pertinent past medical history.     Plan:  -Proceed with discharge tomorrow          Christiano Fry M.D.  "

## 2022-12-01 VITALS
OXYGEN SATURATION: 97 % | TEMPERATURE: 99 F | HEIGHT: 65 IN | DIASTOLIC BLOOD PRESSURE: 107 MMHG | RESPIRATION RATE: 18 BRPM | HEART RATE: 76 BPM | BODY MASS INDEX: 34.75 KG/M2 | SYSTOLIC BLOOD PRESSURE: 150 MMHG | WEIGHT: 208.56 LBS

## 2022-12-01 PROCEDURE — 25000003 PHARM REV CODE 250: Performed by: PSYCHIATRY & NEUROLOGY

## 2022-12-01 PROCEDURE — 25000003 PHARM REV CODE 250: Performed by: PEDIATRICS

## 2022-12-01 RX ADMIN — SERTRALINE HYDROCHLORIDE 100 MG: 50 TABLET ORAL at 08:12

## 2022-12-01 RX ADMIN — LISINOPRIL 10 MG: 10 TABLET ORAL at 08:12

## 2022-12-01 NOTE — PLAN OF CARE
Problem: Activity and Energy Impairment (Depressive Signs/Symptoms)  Goal: Optimized Energy Level (Depressive Signs/Symptoms)  Outcome: Met     Problem: Decreased Participation/Engagement (Depressive Signs/Symptoms)  Goal: Increased Participation and Engagement (Depressive Signs/Symptoms)  Outcome: Met     Problem: Feelings of Worthlessness, Hopelessness or Excessive Guilt (Depressive Signs/Symptoms)  Goal: Enhanced Self-Esteem and Confidence (Depressive Signs/Symptoms)  Outcome: Met     Problem: Mood Impairment (Depressive Signs/Symptoms)  Goal: Improved Mood Symptoms (Depressive Signs/Symptoms)  Outcome: Met

## 2022-12-01 NOTE — NURSING
Pt is scheduled for discharge today. Pt is  Voicing no ADRs or physical complaints at  This time. Currently voicing no suicidal or  Homicidal ideations at this time, Voicing no  Hallucinations or delusions at this time, pt was  Given discharge instructions, voiced understanding, pt will receive a prescription  For discharge medication. Pt will receive a  Copy of discharge instruction which also has  After care appts.  Pt's family will  pt  Upon when discharged, pt's belongings will  Be returned to pt.

## 2022-12-01 NOTE — GROUP NOTE
Group Psychotherapy       Group Focus: Psychodynamic Group Psychotherapy      Number of patients in attendance: Group topic: Communication Skills: Therapist explored patients need for increasing communication skills through assertiveness or active listening. Patient was able to discuss examples of bad communication and methods to improve communication in their own lives. Patient continues to make progress towards treatment goals.       Group Start Time: 1045  Group End Time:  1115  Groups Date: 12/1/2022  Group Topic:  Behavioral Health  Group Department[facility]  Group Facilitators:  Jose Luis Moreira LCSW  _____________________________________________________________________    Patient Name: Familia Gamboa  MRN: [id number]  Patient Class: IP- Psych   Admission Date\Time: 11/25/2022  7:57 AM  Hospital Length of Stay: 6  Primary Care Provider: Harjit Jean MD     Referred by: Acute Psychiatry Unit Treatment Team     Target symptoms: Depression     Patient's response to treatment: Active Listening and Self-disclosure     Progress toward goals: Progressing well          Plan: Continue treatment on APU

## 2022-12-01 NOTE — PROGRESS NOTES
"12/1/2022 11:39 AM   Familia Gamboa   1980   [id number]        Psychiatry Progress Note     SUBJECTIVE:   Familia Gamboa is a 42 y.o. male placed under a PEC at [facility] after calling a phone number that his girlfriend had been texting and a male answered, then attempting to get police to shoot him.  Staff states that he has been doing well.  No overt behavioral issues noted.  No physical aggression.  States that he is doing "great" and has been sleeping well.  Denies suicidal ideation.  Patient is euthymic today and states that he will be moving to [place] and will start looking for a job. States that he is tolerating the medication well without side effects.     UDS: (-)  Blood alcohol: <10       Current Medications:   Scheduled Meds:    lisinopriL  10 mg Oral Daily    nicotine  1 patch Transdermal Daily    sertraline  100 mg Oral Daily      PRN Meds: acetaminophen, aluminum-magnesium hydroxide-simethicone, cloNIDine, hydrOXYzine HCL, magnesium hydroxide 400 mg/5 ml, ondansetron, promethazine, trazodone   Psychotherapeutics (From admission, onward)      Start     Stop Route Frequency Ordered    11/29/22 0900  sertraline tablet 100 mg         -- Oral Daily 11/28/22 1144    11/25/22 0839  traZODone tablet 100 mg         -- Oral Nightly PRN 11/25/22 0839            Allergies:   Review of patient's allergies indicates:  No Known Allergies     OBJECTIVE:   Vitals   Vitals:    11/30/22 0701   BP: (!) 149/99   Pulse: 74   Resp: 18   Temp: 98.8 °F (37.1 °C)        Labs/Imaging/Studies:   No results found for this or any previous visit (from the past 36 hour(s)).       Medical Review Of Systems:  Constitutional: negative  Respiratory: negative  Cardiovascular: negative  Gastrointestinal: negative  Genitourinary:negative  Musculoskeletal:negative  Neurological: negative      Psychiatric Mental Status Exam:  General Appearance: appears stated age, well-developed, well-nourished  Arousal: alert  Behavior: " "cooperative  Movements and Motor Activity: no abnormal involuntary movements noted  Orientation: oriented to person, place, time, and situation  Speech: normal rate, normal rhythm, normal volume, normal tone  Mood: "Great"  Affect: constricted  Thought Process: linear  Associations: intact  Thought Content and Perceptions: no suicidal ideation, no homicidal ideation, no auditory hallucinations, no visual hallucinations, no paranoid ideation, no ideas of reference, no evidence of delusions or psychosis  Recent and Remote Memory: recent memory intact, remote memory intact  Attention and Concentration: intact, attentive to conversation  Fund of Knowledge: intact, aware of current events, vocabulary appropriate  Insight: intact  Judgment: intact    ASSESSMENT/PLAN:   Diagnosis:  Major Depressive Disorder, single episode, severe (F32.2)    History reviewed. No pertinent past medical history.     Plan:  -Proceed with discharge         SHANON Aldana-BC  "

## 2022-12-04 NOTE — DISCHARGE SUMMARY
DISCHARGE SUMMARY  PSYCHIATRY      Admit Date: 11/25/2022  7:57 AM    Discharge Date:  12/1/2022    SITE:   [facility]    Discharge Attending Physician: Christiano Fry M.D.    History of Present Illness On Admit:   Familia Gamboa is a 42 y.o. male placed under a PEC at [facility] after calling a phone number that his girlfriend had been texting and a male answered, then attempting to get police to shoot him.  He states that he has a child with this individual.  They were on the way back from [place] to [place] and stopped in [place].  He told his girlfriend to continue home.  When police arrived, he told them that he did not want to live.  He is tearful at times discussing the circumstance with his girlfriend.  Denies prior suicide attempts. Hypertensive.  Will have medical address.  Admitted for medication management and safety monitoring.     UDS: (-)  Blood alcohol: <10    Diagnoses:  PRINCIPAL PROBLEM: Major depressive disorder, single episode, severe without psychotic features    PROBLEM LIST    Major depressive disorder, single episode, severe without psychotic features    Essential hypertension      Discharged Condition: Stable    Hospital Course:   Patient was admitted to [facility] and started on sertraline 50mg daily.  He tolerated this well and his hopelessness began to improve.  Sertraline was increased to 100mg daily to further assist with depression treatment.  He was not a behavioral issues and was compliant with his treatment regimen.  His discharge plan was to go to his sister's home in [place].  During the program course he was educated on the dynamic aspects of his disorder.  Individual and group psychotherapy sessions focused on disease process, symptom management, positive coping skills, healthy living skills, and leisure skills.  Nursing groups focused on medications and the importance of both medication and treatment  "compliance.  He was compliant with all ADLs.  His sleep, appetite, energy levels, and motivation all continued to improve.  He achieved maximum benefit of inpatient hospitalization and at this level of functioning was discharged to his sister's house.      Disposition: discharge to his sister's home      DISCHARGE EXAMINATION    VITALS   Vitals:    11/28/22 0700 11/29/22 0700 11/30/22 0701 12/01/22 0701   BP: (!) 145/101 (!) 155/83 (!) 149/99 (!) 150/107   Pulse: 77 71 74 76   Resp: 18 18 18 18   Temp: 98.8 °F (37.1 °C) 97.9 °F (36.6 °C) 98.8 °F (37.1 °C) 98.6 °F (37 °C)   TempSrc:       SpO2: 95% 97% 96% 97%   Weight:       Height:             General Appearance: appears stated age, well-developed, well-nourished  Arousal: alert  Behavior: cooperative  Movements and Motor Activity: no abnormal involuntary movements noted  Orientation: oriented to person, place, time, and situation  Speech: normal rate, normal rhythm, normal volume, normal tone  Mood: "Great"  Affect: constricted  Thought Process: linear  Associations: intact  Thought Content and Perceptions: no suicidal ideation, no homicidal ideation, no auditory hallucinations, no visual hallucinations, no paranoid ideation, no ideas of reference, no evidence of delusions or psychosis  Recent and Remote Memory: recent memory intact, remote memory intact  Attention and Concentration: intact, attentive to conversation  Fund of Knowledge: intact, aware of current events, vocabulary appropriate  Insight: intact  Judgment: intact      Medication Regimen:  No current facility-administered medications for this encounter.    Current Outpatient Medications:     lisinopriL 10 MG tablet, Take 1 tablet (10 mg total) by mouth once daily., Disp: 30 tablet, Rfl: 0    sertraline (ZOLOFT) 100 MG tablet, Take 1 tablet (100 mg total) by mouth once daily., Disp: 30 tablet, Rfl: 0      Patient Instructions:   Continue medication regimen as prescribed.    Disposition plan per social " worker - see  notes for details.    Patient instructed to call 911 or present to emergency department if any of the following complications develop status post discharge: suicidality, homicidality, or grave disability.     Total time spent discharging patient: <30 minutes      Christiano Fry M.D.

## 2025-06-12 NOTE — PLAN OF CARE
Problem: Activity and Energy Impairment (Depressive Signs/Symptoms)  Goal: Optimized Energy Level (Depressive Signs/Symptoms)  Outcome: Ongoing, Progressing     Problem: Decreased Participation/Engagement (Depressive Signs/Symptoms)  Goal: Increased Participation and Engagement (Depressive Signs/Symptoms)  Outcome: Ongoing, Progressing     Problem: Feelings of Worthlessness, Hopelessness or Excessive Guilt (Depressive Signs/Symptoms)  Goal: Enhanced Self-Esteem and Confidence (Depressive Signs/Symptoms)  Outcome: Ongoing, Progressing     Problem: Mood Impairment (Depressive Signs/Symptoms)  Goal: Improved Mood Symptoms (Depressive Signs/Symptoms)  Outcome: Ongoing, Progressing      verbal instruction/written material